# Patient Record
Sex: MALE | Race: WHITE | Employment: UNEMPLOYED | URBAN - METROPOLITAN AREA
[De-identification: names, ages, dates, MRNs, and addresses within clinical notes are randomized per-mention and may not be internally consistent; named-entity substitution may affect disease eponyms.]

---

## 2017-03-22 PROBLEM — R07.9 CHEST PAIN: Status: ACTIVE | Noted: 2017-03-22

## 2017-04-25 PROBLEM — E87.5 HYPERKALEMIA: Status: ACTIVE | Noted: 2017-04-25

## 2017-07-11 ENCOUNTER — HOSPITAL ENCOUNTER (EMERGENCY)
Age: 52
Discharge: HOME | End: 2017-07-11
Attending: EMERGENCY MEDICINE
Payer: MEDICARE

## 2017-07-11 VITALS
DIASTOLIC BLOOD PRESSURE: 63 MMHG | RESPIRATION RATE: 11 BRPM | SYSTOLIC BLOOD PRESSURE: 129 MMHG | BODY MASS INDEX: 22.96 KG/M2 | HEART RATE: 60 BPM | WEIGHT: 160 LBS | OXYGEN SATURATION: 92 % | TEMPERATURE: 97 F

## 2017-07-11 DIAGNOSIS — N18.6 ESRD ON DIALYSIS (HCC): ICD-10-CM

## 2017-07-11 DIAGNOSIS — E87.5 ACUTE HYPERKALEMIA: Primary | ICD-10-CM

## 2017-07-11 DIAGNOSIS — Z99.2 ESRD ON DIALYSIS (HCC): ICD-10-CM

## 2017-07-11 LAB
ALBUMIN SERPL BCP-MCNC: 3.5 G/DL (ref 3.4–5)
ALBUMIN/GLOB SERPL: 0.9 {RATIO} (ref 0.8–1.7)
ALP SERPL-CCNC: 320 U/L (ref 45–117)
ALT SERPL-CCNC: 41 U/L (ref 16–61)
AMMONIA PLAS-SCNC: 41 UMOL/L (ref 11–32)
ANION GAP BLD CALC-SCNC: 12 MMOL/L (ref 3–18)
ANION GAP BLD CALC-SCNC: 15 MMOL/L (ref 3–18)
ANION GAP BLD CALC-SCNC: 20 MMOL/L (ref 10–20)
ANION GAP BLD CALC-SCNC: 21 MMOL/L (ref 10–20)
AST SERPL W P-5'-P-CCNC: 71 U/L (ref 15–37)
BASOPHILS # BLD AUTO: 0 K/UL (ref 0–0.06)
BASOPHILS # BLD: 0 % (ref 0–2)
BILIRUB SERPL-MCNC: 0.8 MG/DL (ref 0.2–1)
BUN BLD-MCNC: 107 MG/DL (ref 7–18)
BUN BLD-MCNC: 37 MG/DL (ref 7–18)
BUN SERPL-MCNC: 112 MG/DL (ref 7–18)
BUN SERPL-MCNC: 66 MG/DL (ref 7–18)
BUN/CREAT SERPL: 13 (ref 12–20)
BUN/CREAT SERPL: 15 (ref 12–20)
CA-I BLD-MCNC: 0.76 MMOL/L (ref 1.12–1.32)
CA-I BLD-MCNC: 0.86 MMOL/L (ref 1.12–1.32)
CALCIUM SERPL-MCNC: 6.8 MG/DL (ref 8.5–10.1)
CALCIUM SERPL-MCNC: 8 MG/DL (ref 8.5–10.1)
CHLORIDE BLD-SCNC: 100 MMOL/L (ref 100–108)
CHLORIDE BLD-SCNC: 114 MMOL/L (ref 100–108)
CHLORIDE SERPL-SCNC: 98 MMOL/L (ref 100–108)
CHLORIDE SERPL-SCNC: 99 MMOL/L (ref 100–108)
CK MB CFR SERPL CALC: 4.3 % (ref 0–4)
CK MB SERPL-MCNC: 4.2 NG/ML (ref 5–25)
CK SERPL-CCNC: 97 U/L (ref 39–308)
CO2 BLD-SCNC: 16 MMOL/L (ref 19–24)
CO2 BLD-SCNC: 19 MMOL/L (ref 19–24)
CO2 SERPL-SCNC: 18 MMOL/L (ref 21–32)
CO2 SERPL-SCNC: 23 MMOL/L (ref 21–32)
CREAT SERPL-MCNC: 5.26 MG/DL (ref 0.6–1.3)
CREAT SERPL-MCNC: 7.56 MG/DL (ref 0.6–1.3)
CREAT UR-MCNC: 2.4 MG/DL (ref 0.6–1.3)
CREAT UR-MCNC: 7.4 MG/DL (ref 0.6–1.3)
DIFFERENTIAL METHOD BLD: ABNORMAL
EOSINOPHIL # BLD: 0.1 K/UL (ref 0–0.4)
EOSINOPHIL NFR BLD: 1 % (ref 0–5)
ERYTHROCYTE [DISTWIDTH] IN BLOOD BY AUTOMATED COUNT: 14.4 % (ref 11.6–14.5)
GLOBULIN SER CALC-MCNC: 3.8 G/DL (ref 2–4)
GLUCOSE BLD STRIP.AUTO-MCNC: 43 MG/DL (ref 74–106)
GLUCOSE BLD STRIP.AUTO-MCNC: 96 MG/DL (ref 74–106)
GLUCOSE SERPL-MCNC: 150 MG/DL (ref 74–99)
GLUCOSE SERPL-MCNC: 93 MG/DL (ref 74–99)
HBV SURFACE AB SER QL IA: NEGATIVE
HBV SURFACE AB SERPL IA-ACNC: <3.1 MIU/ML
HBV SURFACE AG SER QL: <0.1 INDEX
HBV SURFACE AG SER QL: NEGATIVE
HCT VFR BLD AUTO: 38.4 % (ref 36–48)
HCT VFR BLD CALC: 25 % (ref 36–49)
HCT VFR BLD CALC: 40 % (ref 36–49)
HEP BS AB COMMENT,HBSAC: ABNORMAL
HGB BLD-MCNC: 12.4 G/DL (ref 13–16)
HGB BLD-MCNC: 13.6 G/DL (ref 12–16)
HGB BLD-MCNC: 8.5 G/DL (ref 12–16)
LYMPHOCYTES # BLD AUTO: 28 % (ref 21–52)
LYMPHOCYTES # BLD: 1.6 K/UL (ref 0.9–3.6)
MCH RBC QN AUTO: 30.9 PG (ref 24–34)
MCHC RBC AUTO-ENTMCNC: 32.3 G/DL (ref 31–37)
MCV RBC AUTO: 95.8 FL (ref 74–97)
MONOCYTES # BLD: 0.5 K/UL (ref 0.05–1.2)
MONOCYTES NFR BLD AUTO: 9 % (ref 3–10)
NEUTS SEG # BLD: 3.5 K/UL (ref 1.8–8)
NEUTS SEG NFR BLD AUTO: 62 % (ref 40–73)
PLATELET # BLD AUTO: 134 K/UL (ref 135–420)
PMV BLD AUTO: 10.6 FL (ref 9.2–11.8)
POTASSIUM BLD-SCNC: 2.6 MMOL/L (ref 3.5–5.5)
POTASSIUM BLD-SCNC: 7.9 MMOL/L (ref 3.5–5.5)
POTASSIUM SERPL-SCNC: 4.7 MMOL/L (ref 3.5–5.5)
POTASSIUM SERPL-SCNC: 7.9 MMOL/L (ref 3.5–5.5)
PROT SERPL-MCNC: 7.3 G/DL (ref 6.4–8.2)
RBC # BLD AUTO: 4.01 M/UL (ref 4.7–5.5)
SODIUM BLD-SCNC: 131 MMOL/L (ref 136–145)
SODIUM BLD-SCNC: 147 MMOL/L (ref 136–145)
SODIUM SERPL-SCNC: 132 MMOL/L (ref 136–145)
SODIUM SERPL-SCNC: 133 MMOL/L (ref 136–145)
TROPONIN I SERPL-MCNC: <0.02 NG/ML (ref 0–0.04)
WBC # BLD AUTO: 5.6 K/UL (ref 4.6–13.2)

## 2017-07-11 PROCEDURE — 74011636637 HC RX REV CODE- 636/637

## 2017-07-11 PROCEDURE — 93005 ELECTROCARDIOGRAM TRACING: CPT

## 2017-07-11 PROCEDURE — 86706 HEP B SURFACE ANTIBODY: CPT | Performed by: INTERNAL MEDICINE

## 2017-07-11 PROCEDURE — 96375 TX/PRO/DX INJ NEW DRUG ADDON: CPT

## 2017-07-11 PROCEDURE — 77030029684 HC NEB SM VOL KT MONA -A

## 2017-07-11 PROCEDURE — 87340 HEPATITIS B SURFACE AG IA: CPT | Performed by: INTERNAL MEDICINE

## 2017-07-11 PROCEDURE — 99285 EMERGENCY DEPT VISIT HI MDM: CPT

## 2017-07-11 PROCEDURE — 82550 ASSAY OF CK (CPK): CPT | Performed by: PHYSICIAN ASSISTANT

## 2017-07-11 PROCEDURE — 80048 BASIC METABOLIC PNL TOTAL CA: CPT | Performed by: EMERGENCY MEDICINE

## 2017-07-11 PROCEDURE — 94640 AIRWAY INHALATION TREATMENT: CPT

## 2017-07-11 PROCEDURE — 96374 THER/PROPH/DIAG INJ IV PUSH: CPT

## 2017-07-11 PROCEDURE — 74011000250 HC RX REV CODE- 250: Performed by: EMERGENCY MEDICINE

## 2017-07-11 PROCEDURE — 74011250636 HC RX REV CODE- 250/636: Performed by: EMERGENCY MEDICINE

## 2017-07-11 PROCEDURE — 82140 ASSAY OF AMMONIA: CPT | Performed by: PHYSICIAN ASSISTANT

## 2017-07-11 PROCEDURE — 85025 COMPLETE CBC W/AUTO DIFF WBC: CPT | Performed by: PHYSICIAN ASSISTANT

## 2017-07-11 PROCEDURE — 90935 HEMODIALYSIS ONE EVALUATION: CPT

## 2017-07-11 PROCEDURE — 80053 COMPREHEN METABOLIC PANEL: CPT | Performed by: PHYSICIAN ASSISTANT

## 2017-07-11 PROCEDURE — 80047 BASIC METABLC PNL IONIZED CA: CPT

## 2017-07-11 RX ORDER — SODIUM CHLORIDE 9 MG/ML
250 INJECTION, SOLUTION INTRAVENOUS CONTINUOUS
Status: DISCONTINUED | OUTPATIENT
Start: 2017-07-11 | End: 2017-07-11 | Stop reason: HOSPADM

## 2017-07-11 RX ORDER — ALBUTEROL SULFATE 0.83 MG/ML
2.5 SOLUTION RESPIRATORY (INHALATION)
Status: COMPLETED | OUTPATIENT
Start: 2017-07-11 | End: 2017-07-11

## 2017-07-11 RX ORDER — DEXTROSE 50 % IN WATER (D50W) INTRAVENOUS SYRINGE
25
Status: COMPLETED | OUTPATIENT
Start: 2017-07-11 | End: 2017-07-11

## 2017-07-11 RX ORDER — SODIUM BICARBONATE 1 MEQ/ML
50 SYRINGE (ML) INTRAVENOUS
Status: COMPLETED | OUTPATIENT
Start: 2017-07-11 | End: 2017-07-11

## 2017-07-11 RX ORDER — CALCIUM GLUCONATE 94 MG/ML
INJECTION, SOLUTION INTRAVENOUS
Status: DISCONTINUED
Start: 2017-07-11 | End: 2017-07-11 | Stop reason: HOSPADM

## 2017-07-11 RX ADMIN — SODIUM BICARBONATE 50 MEQ: 84 INJECTION, SOLUTION INTRAVENOUS at 02:58

## 2017-07-11 RX ADMIN — CALCIUM GLUCONATE 1000 MG: 94 INJECTION, SOLUTION INTRAVENOUS at 03:06

## 2017-07-11 RX ADMIN — INSULIN HUMAN 10 UNITS: 100 INJECTION, SOLUTION PARENTERAL at 02:56

## 2017-07-11 RX ADMIN — DEXTROSE MONOHYDRATE 25 G: 25 INJECTION, SOLUTION INTRAVENOUS at 02:54

## 2017-07-11 RX ADMIN — ALBUTEROL SULFATE 2.5 MG: 2.5 SOLUTION RESPIRATORY (INHALATION) at 02:54

## 2017-07-11 NOTE — ED PROVIDER NOTES
HPI Comments: 54yo M with h/o DM, HTN, CKD on dialysis, hyperlipidemia, urinary retention, CHF, CAD, R BKA, alcoholic liver cirrhosis, ESRD, anemia, hyperparathyroidism, and h/o sepsis, presents today due to severe fatigue. symptoms started today. He had dialysis 2 days ago but did not complete it due to \"pain in my arm\". Is due for dialysis tomorrow. He denies chest pain, abdominal pain. He does feel lightheaded. He denies swelling in his legs. He does not produce urine. Patient is a 46 y.o. male presenting with fatigue. Fatigue   Pertinent negatives include no shortness of breath, no chest pain and no confusion.         Past Medical History:   Diagnosis Date    Alcoholic cirrhosis of liver with ascites ([address]     Anasarca     Anemia due to chronic kidney disease     Ascites     BPH (benign prostatic hypertrophy)     Bradycardia     CAD (coronary artery disease)     CHF (congestive heart failure) (HCC)     Chronic indwelling Grissom catheter     Chronic kidney disease     Diabetes ([address]     E. coli septicemia (HCC)     Enlarged prostate     ESRD (end stage renal disease) on dialysis ([address] 11/17/2016    due to HTN/DM/Urinary Retention; HD on TTS at [facility]    Fracture of right hip ([address]     Hepatitis C     History of alcohol abuse     Hx of right BKA ([address] 2014    Hyperlipidemia     Hyperparathyroidism due to renal insufficiency (HCC)     Hypertension     Leukocytosis     Pelvic ascites     Penile edema     Portal hypertension (HCC)     Scrotal edema     Sepsis due to methicillin resistant Staphylococcus aureus (MRSA) ([address] 10/2015    due to right hip hardware infection    Urinary retention     UTI (urinary tract infection)        Past Surgical History:   Procedure Laterality Date    HX AMPUTATION      HX HEART CATHETERIZATION      VASCULAR SURGERY PROCEDURE UNLIST      tdc, fistula left arm          Family History:   Problem Relation Age of Onset    Cancer Brother     Diabetes Father     Diabetes Mother     Cancer Sister        Social History     Social History    Marital status: SINGLE     Spouse name: N/A    Number of children: N/A    Years of education: N/A     Occupational History    Not on file. Social History Main Topics    Smoking status: Current Every Day Smoker     Packs/day: 0.25    Smokeless tobacco: Never Used    Alcohol use No    Drug use: No    Sexual activity: Not on file     Other Topics Concern    Not on file     Social History Narrative    Patient is single, he has no children. Originally from [place], now lives in a rented room at a house    Continues to smoke 1/2 ppd    No recent alcohol or drugs but was a heavy user of both in the past    On disability but used to be in "[employer]"        His mother  at age 61 form unknown cancer    Father alive but whereabouts unknown         ALLERGIES: Review of patient's allergies indicates no known allergies. Review of Systems   Constitutional: Positive for fatigue. Negative for fever. HENT: Negative for facial swelling. Eyes: Negative for visual disturbance. Respiratory: Negative for shortness of breath. Cardiovascular: Negative for chest pain. Gastrointestinal: Negative for abdominal pain. Genitourinary: Negative for dysuria. Musculoskeletal: Negative for neck pain. Skin: Negative for rash. Neurological: Positive for light-headedness. Negative for dizziness. Psychiatric/Behavioral: Negative for confusion. All other systems reviewed and are negative. Vitals:    17 0150 17 0159 17 0200   BP: 94/58 93/54 92/57   Pulse: (!) 53 (!) 53 (!) 54   Resp: 22 22 19   SpO2: 92% 90% 91%            Physical Exam   Constitutional: He appears well-developed and well-nourished. No distress. HENT:   Head: Normocephalic and atraumatic. Eyes: Conjunctivae are normal.   Neck: Normal range of motion. Neck supple. Cardiovascular: Normal rate. Murmur heard. Pulmonary/Chest: Effort normal and breath sounds normal.   Abdominal: Soft. He exhibits distension. There is no tenderness. Musculoskeletal: Normal range of motion. Right BKA. No LE edema. Neurological: He is alert. Skin: Skin is warm and dry. He is not diaphoretic. Psychiatric: He has a normal mood and affect. Nursing note and vitals reviewed. MDM  Number of Diagnoses or Management Options  Diagnosis management comments: Incomplete dialysis 2 days ago, severe fatigue started today and lightheadedness. 0230: K high 7.9. Paged nephrology for urgent HD. Dr. Andrew Resendiz will assume care of patient at this time.       ED Course       Procedures

## 2017-07-11 NOTE — ED NOTES
7:10 AM :Pt care assumed from Dr. López Quinn , ED provider. Pt complaint(s), current treatment plan, progression and available diagnostic results have been discussed thoroughly. Rounding occurred: yes   Intended Disposition: Home   Pending diagnostic reports and/or labs (please list): dialysis and reassess    7:20 AM saw and discussed with pt, he's feeling much better, 90% better, wishes to be discharged. Only complaint that he's very hungry. Strong radial pulse, awake alert, skin pink warm dry  Patient Vitals for the past 4 hrs:   Temp Pulse Resp BP SpO2   07/11/17 0645 - (!) 57 14 143/70 95 %   07/11/17 0630 - (!) 57 17 141/72 95 %   07/11/17 0615 - (!) 57 16 134/71 94 %   07/11/17 0600 - (!) 57 17 139/70 95 %   07/11/17 0545 - (!) 57 18 129/71 95 %   07/11/17 0530 - (!) 58 19 123/70 93 %   07/11/17 0515 - (!) 57 19 115/68 92 %   07/11/17 0500 - (!) 56 19 114/65 93 %   07/11/17 0455 97.4 °F (36.3 °C) (!) 59 18 135/78 92 %       7:43 AM  D/w Ramona Luz dialysis nurse, will check BMP in 2 hrs.  K 2.6 noted, likely needs to equilibrate after dialysis. 10:31 AM  Repeat bmp, k is 4.5. Pt stable. Will discharge.

## 2017-07-11 NOTE — ED NOTES
Cleaned patient up to be ready for discharge - cleaned patient with bath wipes and put on fresh depend. Got patient dressed and removed IV after drawing POC Chem 8 (due to potassium level, will need to stay until a redraw of BMP).

## 2017-07-11 NOTE — ED NOTES
Pt being discharged home. Discharge instructions given, and pt expresses understanding. Discontinued IV and helped patient to gather belongings. Pt discharged with [facility] transport home.

## 2017-07-11 NOTE — DIALYSIS
ACUTE HEMODIALYSIS FLOW SHEET    HEMODIALYSIS ORDERS: Physician: Dr. Pedroza Jeremiah: [place]        Duration: 2.0 hr  BFR: 350   DFR: 800   Dialysate:  Temp 37.0  K+ 1.0    Ca+ 2.5  Na 140  Bicarb 35   Weight:   kg    Bed Scale []     Unable to Obtain [x]        Dry weight/UF Goal: 2000 mL   Access LUE AVF  Needle Gauge 15 GAUGE 1 INCH    Heparin []  Bolus      Units    [] Hourly       Units    [x]None     Catheter locking solution N/A   Pre BP: 135/78    Pulse:  59    Temperature:97.4  Respirations: 18  Tx: NS       ml/Bolus  Other        [x] N/A   Labs: Pre   Hep B status Ag/AB     Post:        [x] N/A   Additional Orders(medications, blood products, hypotension management):       [x] N/A     [x] Time Out/Safety Check  [x] [facility] Consent Verified     CATHETER ACCESS: [x]N/A   []Right   []Left   []IJ     []Fem   [] First use X-ray verified     []Tunnel                [] Non Tunneled   []No S/S infection  []Redness  []Drainage []Cultured []Swelling []Pain   []Medical Aseptic Prep Utilized   []Dressing Changed  [] Biopatch  Date:       []Clotted   []Patent   Flows: []Good  []Poor  []Reversed   If access problem,  notified: []Yes    [x]N/A  Date:           GRAFT/FISTULA ACCESS:  []N/A     []Right     [x]Left     [x]UE     []LE   []AVG   [x]AVF        []Buttonhole    [x]Medical Aseptic Prep Utilized   [x]No S/S infection  []Redness  []Drainage []Cultured []Swelling []Pain    Bruit:   [x] Strong    [] Weak       Thrill :   [x] Strong    [] Weak       Needle Gauge: 15   Length: 1   If access problem,  notified: []Yes     [x]N/A  Date:        Please describe access if present and not used:       GENERAL ASSESSMENT:    LUNGS:  Rate 59 SaO2%  92    [] N/A    [] Clear  [] Coarse  [] Crackles  [] Wheezing        [] Diminished     Location : []RLL   []LLL    []RUL  []KRISTY   Cough: []Productive  []Dry  [x]N/A   Respirations:  [x]Easy  []Labored   Therapy:  [x]RA  []NC  l/min    Mask: []NRB []Venti O2%                  []Ventilator  []Intubated  [] Trach  [] BiPaP   CARDIAC: []Regular      [] Irregular   [] Pericardial Rub  [] JVD        [x]  Monitored  [] Bedside  [] Remotely monitored [] N/A  Rhythm:    EDEMA: [] None  [x]Generalized  [] Pitting [] 1    [] 2    [] 3    [] 4                 [] Facial  [] Pedal  []  UE  [] LE   SKIN:   [x] Warm  [] Hot     [] Cold   [x] Dry     [] Pale   [] Diaphoretic                  [] Flushed  [] Jaundiced  [] Cyanotic  [] Rash  [] Weeping   LOC:    [x] Alert      [x]Oriented:    [x] Person     [x] Place  [x]Time               [] Confused  [] Lethargic  [] Medicated  [] Non-responsive     GI / ABDOMEN:  [] Flat    [] Distended    [x] Soft    [] Firm   []  Obese                             [] Diarrhea  [x] Bowel Sounds  [] Nausea  [] Vomiting       / URINE ASSESSMENT:[] Voiding   [x] Oliguria  [] Anuria   []  Grissom     [] Incontinent    []  Incontinent Brief      []  Bathroom Privileges     PAIN: [x] 0 []1  []2   []3   []4   []5   []6   []7   []8   []9   []10            Scale 0-10  Action/Follow Up:    MOBILITY:  [] Amb    [] Amb/Assist    [x] Bed    [] Wheelchair  [] Stretcher      All Vitals and Treatment Details on Attached [address] [facility] [address]        Room # ER 08     [] 1st Time Acute  [] Stat  [x] Routine  [] Urgent     [x] Acute Room  []  Bedside  [] ICU/CCU  [] ER   Isolation Precautions:  [x] Dialysis   [] Airborne   [] Contact    [] Reverse   Special Considerations:         [] Blood Consent Verified [x]N/A     ALLERGIES:   [x] NKA          Code Status:  [x] Full Code  [] DNR  [] Other           HBsAg ONLY: Date Drawn 07/02/17       [x]Negative []Positive []Unknown   HBsAb: Date     [] Susceptible   [] Valmto38 []Not Drawn  [x] Drawn  Hep B results obtained from [facility]      Current Labs:    Date of Labs: Today [x]      Results for Iona Cuellar (MRN [id number]) as of 7/11/2017 05:33   Ref. Range 7/11/2017 02:38   GLUCOSE,FAST - POC Latest Ref Range: 74 - 106 MG/DL 96   Sodium (POC) Latest Ref Range: 136 - 145 MMOL/L 131 (L)   Potassium (POC) Latest Ref Range: 3.5 - 5.5 MMOL/L 7.9 (HH)   Chloride, POC Latest Ref Range: 100 - 108 MMOL/L 100   CO2, POC Latest Ref Range: 19 - 24 MMOL/L 19   BUN, POC Latest Ref Range: 7 - 18 MG/ (H)   Anion gap, POC Latest Ref Range: 10 - 20   20   Creatinine, POC Latest Ref Range: 0.6 - 1.3 MG/DL 7.4 (H)   Calcium, ionized (POC) Latest Ref Range: 1.12 - 1.32 MMOL/L 0.86 (L)   GFRNA, POC Latest Ref Range: >60 ml/min/1.73m2 8 (L)   GFRAA, POC Latest Ref Range: >60 ml/min/1.73m2 9 (L)   Hemoglobin (POC) Latest Ref Range: 12 - 16 G/DL 13.6   Hematocrit (POC) Latest Ref Range: 36 - 49 % 40                                                                                                                                 DIET:  [x] Renal    [] Other     [] NPO     []  Diabetic      PRIMARY NURSE REPORT: First initial/Last name/Title      Pre Dialysis: A. January, [facility]   Time: 3375     EDUCATION:    [x] Patient [] Other         Knowledge Basis: []None [x]Minimal [] Substantial   Barriers to learning  []N/A   [] Access Care     [] S&S of infection     [] Fluid Management     [x]K+     [x]Procedural    []Albumin     [] Medications     [] Tx Options     [] Transplant     [] Diet     [] Other   Teaching Tools:  [x] Explain  [] Demo  [] Handouts [] Video  Patient response:   [x] Verbalized understanding  [] Teach back  [] Return demonstration [x] Requires follow up   Inappropriate due to            [address] Before each treatment:     Machine Number:                   [facility]                                   [] Unit Machine #  with centralized RO                                  [] Portable Machine #1/RO serial # [id number]                                  [] Portable Machine #2/RO serial # [id number]                                  [] Portable Machine #3/RO serial # [id number]                                                                                                       [facility]                                  [] Portable Machine #11/RO serial # [id number]                                   [x] Portable Machine #12/RO serial # [id number]                                  [] Portable Machine #13/RO serial #  [id number]      Alarm Test:  Pass time 2568         Other:         [x] RO/Machine Log Complete      Temp  37.0           [x]Extracorporeal Circuit Tested for integrity   Dialysate: pH  7.2  Conductivity: Meter  14     HD Machine  14.0              TCD: 13.7  Dialyzer Lot # [id number] 05/31/19          Blood Tubing Lot #[id number] 10/31/21            Saline Lot #  [id number] 10/31/21     CHLORINE TESTING-Before each treatment and every 4 hours    Total Chlorine: [x] less than 0.1 ppm  Time: 0440 4 Hr/2nd Check Time:    (if greater than 0.1 ppm from Primary then every 30 minutes from Secondary)     TREATMENT INITIATION  with Dialysis Precautions:   [x] All Connections Secured                 [x] Saline Line Double Clamped   [x] Venous Parameters Set                  [x] Arterial Parameters Set    [x] Prime Given 250 ml               [x]Air Foam Detector Engaged      Treatment Initiation Note:   9952- Pt in ER with eyes open, awake, alert verbally responsive, pt denies pain at this time, vital signs stable, AVF in LUE  Assessed and accessed without complications, dialysis started at this time  0500- Pt continues dialysis, BFR adjusted to lower speed to prevent high pressure alarm at this time   0515- Pt resting with eyes closed   0530- Pt has no changes at this time, dialysis continues  0545- Pt continues dialysis  0600- Pt has no changes   0615-Dialysis continues without complications  2004- Pt easily aroused when name called, resting with eyes closed at this time   0645- Pt has no changes noted at this time, vital signs stable        Medication Dose Volume Route Initials Dialyzer Cleared: [] Good [x] Fair  [] Poor    Blood processed: 45.6  L  UF Removed  2000 mL    Post Wt:     kg  POst BP: 157/76     Pulse: 58   Respirations: 18 Temperature: 97.0       NaCl 0.9%          250 mL Prime, 250 mL Rinse          IV     ALEXANDRU     Post Tx Vascular Access: AVF/AVG: Bleeding stopped Art 5 min. Dave. 5 Min                                      Catheter: Locking solution: Heparin   N/A                                 Post Assessment:                                    Skin:  [x] Warm  [x] Dry [] Diaphoretic    [] Flushed  [] Pale [] Cyanotic   [facility] Signatures Title Initials  Time Lungs: [x] Clear    [] Course  [] Crackles  [] Wheezing [] Diminished   Deivn Calderón  RN  [facility]  Cardiac: [] Regular   [] Irregular   [x] Monitor  [] N/A  Rhythm:           Edema:  [x] None    [] General     [] Facial   [] Pedal    [] UE    [] LE       Pain: [x]0  []1  []2   []3  []4   []5   []6   []7   []8   []9   []10         Post Treatment Note: 9845- Pt completed dialysis removed 2 Liters uf without complications, pt remains in ER at this time      POST TREATMENT PRIMARY NURSE HANDOFF REPORT:     First initial/Last name/Title         Post Dialysis: JOSEPH iSn RN  Time: 0700     Abbreviations: AVG-arterial venous graft, AVF-arterial venous fistula, IJ-Internal Jugular, Subcl-Subclavian, Fem-Femoral, Tx-treatment, AP/HR-apical heart rate, DFR-dialysate flow rate, BFR-blood flow rate, AP-arterial pressure, -venous pressure, UF-ultrafiltrate, TMP-transmembrane pressure, Dave-Venous, Art-Arterial, RO-Reverse Osmosis

## 2017-07-11 NOTE — ED TRIAGE NOTES
Patient states that he is feeling weak.   Patient received a 1/2 dialysis treatment on Saturday and was outside all day today

## 2017-07-12 LAB
ATRIAL RATE: 53 BPM
CALCULATED P AXIS, ECG09: 47 DEGREES
CALCULATED R AXIS, ECG10: -28 DEGREES
CALCULATED T AXIS, ECG11: 47 DEGREES
DIAGNOSIS, 93000: NORMAL
P-R INTERVAL, ECG05: 256 MS
Q-T INTERVAL, ECG07: 478 MS
QRS DURATION, ECG06: 120 MS
QTC CALCULATION (BEZET), ECG08: 448 MS
VENTRICULAR RATE, ECG03: 53 BPM

## 2017-07-13 LAB
ANION GAP BLD CALC-SCNC: 19 MMOL/L (ref 10–20)
BUN BLD-MCNC: 106 MG/DL (ref 7–18)
CA-I BLD-MCNC: 0.84 MMOL/L (ref 1.12–1.32)
CHLORIDE BLD-SCNC: 101 MMOL/L (ref 100–108)
CO2 BLD-SCNC: 20 MMOL/L (ref 19–24)
CREAT UR-MCNC: 7.3 MG/DL (ref 0.6–1.3)
GLUCOSE BLD STRIP.AUTO-MCNC: 94 MG/DL (ref 74–106)
HCT VFR BLD CALC: 41 % (ref 36–49)
HGB BLD-MCNC: 13.9 G/DL (ref 12–16)
POTASSIUM BLD-SCNC: 7.9 MMOL/L (ref 3.5–5.5)
SODIUM BLD-SCNC: 131 MMOL/L (ref 136–145)

## 2017-09-13 PROBLEM — R00.0 TACHYCARDIA WITH HEART RATE 141-160 BEATS PER MINUTE: Status: ACTIVE | Noted: 2017-09-13

## 2017-10-10 PROBLEM — L03.119 CELLULITIS OF HAND: Status: ACTIVE | Noted: 2017-10-10

## 2017-12-09 PROBLEM — R00.1 BRADYCARDIA, SEVERE SINUS: Status: ACTIVE | Noted: 2017-12-09

## 2017-12-09 PROBLEM — I95.9 HYPOTENSION: Status: ACTIVE | Noted: 2017-12-09

## 2017-12-09 PROBLEM — N18.9 CHRONIC RENAL FAILURE: Status: ACTIVE | Noted: 2017-12-09
